# Patient Record
(demographics unavailable — no encounter records)

---

## 2024-10-20 NOTE — HISTORY OF PRESENT ILLNESS
[Cow's milk (Ounces per day ___)] : consumes [unfilled] oz of cow's milk per day [Fruit] : fruit [Vegetables] : vegetables [Meat] : meat [Finger Foods] : finger foods [Table food] : table food [___ voids per day] : [unfilled] voids per day [Normal] : Normal [In crib] : In crib [Sippy cup use] : Sippy cup use [Bottle in bed] : Bottle in bed [Brushing teeth] : Brushing teeth [No] : Not at  exposure [Water heater temperature set at <120 degrees F] : Water heater temperature set at <120 degrees F [Car seat in back seat] : Car seat in back seat [Carbon Monoxide Detectors] : Carbon monoxide detectors [Smoke Detectors] : Smoke detectors [Parents] : parents [Delayed] : Delayed [Exposure to electronic nicotine delivery system] : No exposure to electronic nicotine delivery system

## 2024-10-20 NOTE — DISCUSSION/SUMMARY
[Communication and Social Development] : communication and social development [Sleep Routines and Issues] : sleep routines and issues [Temper Tantrums and Discipline] : temper tantrums and discipline [Healthy Teeth] : healthy teeth [Safety] : safety [No Medications] : ~He/She~ is not on any medications [Mother] : mother [Father] : father [] : The components of the vaccine(s) to be administered today are listed in the plan of care. The disease(s) for which the vaccine(s) are intended to prevent and the risks have been discussed with the caretaker.  The risks are also included in the appropriate vaccination information statements which have been provided to the patient's caregiver.  The caregiver has given consent to vaccinate. [FreeTextEntry1] : Discussed delays of the child with parents and they do state he gets a lot of screen time throughout the day.  Recommend to stop all screen time immediately and start interacting with the child all day with activities and reading. Will reevaluate in 1 month and if delays persist will refer to EI.  Continue whole cow's milk. Continue table foods, 3 meals with 2-3 snacks per day. Incorporate flourinated water daily in a sippy cup. Brush teeth twice a day with soft toothbrush. Recommend visit to dentist. When in car, keep child in rear-facing car seats until age 2, or until  the maximum height and weight for seat is reached. Put baby to sleep in own crib. Lower crib matress. Help baby to maintain consistent daily routines and sleep schedule. Recognize stranger and separation anxiety. Ensure home is safe since baby is increasingly mobile. Be within arm's reach of baby at all times. Use consistent, positive discipline. Read aloud to baby.  Return in 1 mo for 18 mo well child check.

## 2024-10-20 NOTE — DEVELOPMENTAL MILESTONES
[Uses 3 words other than names] : uses 3 words other than names [Yes: _______] : yes, [unfilled] [Speaks in sounds that seem like] : speaks in sounds that seem like an unknown language [Imitates scribbling] : does not imitate scribbling [Drinks from cup with little] : does not drink from cup with little spilling [Points to ask for something] : does not point to ask for something or to get help [Follows directions that do not] : does not follow direction that do not include a gesture [Looks when parent says,] : does not look when parent says, "Where is...?" [Squats to  objects] : does not squat to  objects [Begins to run] : does not begin to run [Makes dorita with crayon] : does not makes dorita with crayon [FreeTextEntry1] : Discussed delays of the child with parents and they do state he gets a lot of screen time throughout the day.  Recommend to stop all screen time immediately and start interacting with the child all day with activities and reading. Will reevaluate in 1 month and if delays persist will refer to EI.

## 2024-10-20 NOTE — PHYSICAL EXAM
[Alert] : alert [No Acute Distress] : no acute distress [Normocephalic] : normocephalic [Anterior [place] Closed] : anterior fontanelle closed [Red Reflex Bilateral] : red reflex bilateral [PERRL] : PERRL [Normally Placed Ears] : normally placed ears [Auricles Well Formed] : auricles well formed [Clear Tympanic membranes with present light reflex and bony landmarks] : clear tympanic membranes with present light reflex and bony landmarks [No Discharge] : no discharge [Nares Patent] : nares patent [Palate Intact] : palate intact [Uvula Midline] : uvula midline [Tooth Eruption] : tooth eruption  [Supple, full passive range of motion] : supple, full passive range of motion [No Palpable Masses] : no palpable masses [Symmetric Chest Rise] : symmetric chest rise [Clear to Auscultation Bilaterally] : clear to auscultation bilaterally [Regular Rate and Rhythm] : regular rate and rhythm [S1, S2 present] : S1, S2 present [No Murmurs] : no murmurs [+2 Femoral Pulses] : +2 femoral pulses [Soft] : soft [NonTender] : non tender [Non Distended] : non distended [Normoactive Bowel Sounds] : normoactive bowel sounds [No Hepatomegaly] : no hepatomegaly [No Splenomegaly] : no splenomegaly [Central Urethral Opening] : central urethral opening [Testicles Descended Bilaterally] : testicles descended bilaterally [Patent] : patent [Normally Placed] : normally placed [No Abnormal Lymph Nodes Palpated] : no abnormal lymph nodes palpated [No Clavicular Crepitus] : no clavicular crepitus [Negative Burdick-Ortalani] : negative Burdick-Ortalani [Symmetric Buttocks Creases] : symmetric buttocks creases [No Spinal Dimple] : no spinal dimple [NoTuft of Hair] : no tuft of hair [Cranial Nerves Grossly Intact] : cranial nerves grossly intact [No Rash or Lesions] : no rash or lesions

## 2024-10-20 NOTE — PHYSICAL EXAM
[Alert] : alert [No Acute Distress] : no acute distress [Normocephalic] : normocephalic [Anterior [place] Closed] : anterior fontanelle closed [Red Reflex Bilateral] : red reflex bilateral [PERRL] : PERRL [Normally Placed Ears] : normally placed ears [Auricles Well Formed] : auricles well formed [Clear Tympanic membranes with present light reflex and bony landmarks] : clear tympanic membranes with present light reflex and bony landmarks [No Discharge] : no discharge [Nares Patent] : nares patent [Palate Intact] : palate intact [Uvula Midline] : uvula midline [Tooth Eruption] : tooth eruption  [Supple, full passive range of motion] : supple, full passive range of motion [No Palpable Masses] : no palpable masses [Symmetric Chest Rise] : symmetric chest rise [Clear to Auscultation Bilaterally] : clear to auscultation bilaterally [Regular Rate and Rhythm] : regular rate and rhythm [S1, S2 present] : S1, S2 present [No Murmurs] : no murmurs [+2 Femoral Pulses] : +2 femoral pulses [Soft] : soft [NonTender] : non tender [Non Distended] : non distended [Normoactive Bowel Sounds] : normoactive bowel sounds [No Hepatomegaly] : no hepatomegaly [No Splenomegaly] : no splenomegaly [Central Urethral Opening] : central urethral opening [Testicles Descended Bilaterally] : testicles descended bilaterally [Patent] : patent [Normally Placed] : normally placed [No Abnormal Lymph Nodes Palpated] : no abnormal lymph nodes palpated [No Clavicular Crepitus] : no clavicular crepitus [Negative Burdick-Ortalani] : negative Burdick-Ortalani [Symmetric Buttocks Creases] : symmetric buttocks creases [No Spinal Dimple] : no spinal dimple [NoTuft of Hair] : no tuft of hair [Cranial Nerves Grossly Intact] : cranial nerves grossly intact [No Rash or Lesions] : no rash or lesions

## 2024-11-16 NOTE — HISTORY OF PRESENT ILLNESS
[de-identified] : ear infection/developmental concerns [FreeTextEntry6] : The patient is a 17-month-old male who has shown significant improvement since the last visit, including increased interaction, pointing, following of commands and rhythm in singing after completely stopping all screen time. Nasal congestion and cough also have stopped after starting the antibiotic and child seems to have improved.

## 2024-11-16 NOTE — DISCUSSION/SUMMARY
[FreeTextEntry1] : Assessment and Plan: - Ear Infection : Parent-reported improvement in patient's condition. Child has completed the course of antibiotic. - Patient Education: Antibiotic course is completed and child has improved.   - Development : Patient shows improvement in developmental activities including increased interaction and following commands. - Therapeutic Interventions: Continue with reading and having stimulating play with the child. Continue avoiding all screen time. - Patient Education: Parents were encouraged to continue their observation and interaction with the patient. - Follow-Up: 1 month for the 18mo well visit  - Vaccines : Parents are considering administering vaccines during this visit and would like to continue vaccinating 1 at a time. - Therapeutic Interventions: Parents would like 1 vaccine at a time. Dtap offered today. - Patient Education: Vaccine education and risks of vaccinating 1 at a time instead of getting the child fully vaccinated discussed.

## 2025-01-17 NOTE — HISTORY OF PRESENT ILLNESS
[Parents] : parents [Cow's milk (Ounces per day ___)] : consumes [unfilled] oz of Cow's milk per day [Fruit] : fruit [Vegetables] : vegetables [Cereal] : cereal [Table food] : table food [___ stools per day] : [unfilled]  stools per day [Loose] : loose consistency [___ voids per day] : [unfilled] voids per day [Normal] : Normal [Pacifier use] : Pacifier use [Sippy cup use] : Sippy cup use [Bottle in bed] : Bottle in bed [Brushing teeth] : Brushing teeth [Toothpaste] : Primary Fluoride Source: Toothpaste [Playtime] : Playtime  [Temper Tantrums] : Temper Tantrums [No] : Not at  exposure [Water heater temperature set at <120 degrees F] : Water heater temperature set at <120 degrees F [Car seat in back seat] : Car seat in back seat [Carbon Monoxide Detectors] : Carbon monoxide detectors [Smoke Detectors] : Smoke detectors [Exposure to electronic nicotine delivery system] : No exposure to electronic nicotine delivery system [de-identified] : allergic to eggs [FreeTextEntry3] : co-sleeping

## 2025-01-17 NOTE — DEVELOPMENTAL MILESTONES
[Engages with others for play] : engages with others for play [Points to pictures in book] : points to pictures in book [Points to object of interest to] : points to object of interest to draw attention to it [Turns and looks at adult if] : turns and looks at adult if something new happens [Sits in small chair] : sits in small chair [Carries toy while walking] : carries toy while walking [Scribbles spontaneously] : scribbles spontaneously [Throws small ball a few feet] : throws a small ball a few feet while standing [Yes] : Completed. [Help dress and undress self] : does not help dress and undress self [Begins to scoop with spoon] : does not begin to scoop with spoon [Uses 6 to 10 words other than] : does not use 6 to 10 words other than names [Identifies at least 2 body parts] : does not indentify at least 2 body parts [Walks up with 2 feet per step] : does not walk up with 2 feet per step with hand held

## 2025-01-17 NOTE — HISTORY OF PRESENT ILLNESS
[Parents] : parents [Cow's milk (Ounces per day ___)] : consumes [unfilled] oz of Cow's milk per day [Fruit] : fruit [Vegetables] : vegetables [Cereal] : cereal [Table food] : table food [___ stools per day] : [unfilled]  stools per day [Loose] : loose consistency [___ voids per day] : [unfilled] voids per day [Normal] : Normal [Pacifier use] : Pacifier use [Sippy cup use] : Sippy cup use [Bottle in bed] : Bottle in bed [Brushing teeth] : Brushing teeth [Toothpaste] : Primary Fluoride Source: Toothpaste [Playtime] : Playtime  [Temper Tantrums] : Temper Tantrums [No] : Not at  exposure [Water heater temperature set at <120 degrees F] : Water heater temperature set at <120 degrees F [Car seat in back seat] : Car seat in back seat [Carbon Monoxide Detectors] : Carbon monoxide detectors [Smoke Detectors] : Smoke detectors [Exposure to electronic nicotine delivery system] : No exposure to electronic nicotine delivery system [de-identified] : allergic to eggs [FreeTextEntry3] : co-sleeping

## 2025-01-17 NOTE — DISCUSSION/SUMMARY
[Family Support] : family support [Child Development and Behavior] : child development and behavior [Language Promotion/Hearing] : language promotion/hearing [[place] Training Readiness] : toliet training readiness [Safety] : safety [No Medications] : ~He/She~ is not on any medications [Mother] : mother [Father] : father [] : The components of the vaccine(s) to be administered today are listed in the plan of care. The disease(s) for which the vaccine(s) are intended to prevent and the risks have been discussed with the caretaker.  The risks are also included in the appropriate vaccination information statements which have been provided to the patient's caregiver.  The caregiver has given consent to vaccinate. [FreeTextEntry1] : Continue whole cow's milk. Continue table foods, 3 meals with 2-3 snacks per day. Incorporate flourinated water daily in a sippy cup. Brush teeth twice a day with soft toothbrush. Recommend visit to dentist. When in car, keep child in rear-facing car seats until age 2, or until  the maximum height and weight for seat is reached. Put todder to sleep in own bed or crib. Help toddler to maintain consistent daily routines and sleep schedule. Toilet training discussed. Recognize anxiety in new settings. Ensure home is safe. Be within arm's reach of toddler at all times. Use consistent, positive discipline. Read aloud to toddler.

## 2025-01-17 NOTE — PHYSICAL EXAM
[Alert] : alert [No Acute Distress] : no acute distress [Normocephalic] : normocephalic [Anterior [place] Closed] : anterior fontanelle closed [Red Reflex Bilateral] : red reflex bilateral [PERRL] : PERRL [Normally Placed Ears] : normally placed ears [Auricles Well Formed] : auricles well formed [Clear Tympanic membranes with present light reflex and bony landmarks] : clear tympanic membranes with present light reflex and bony landmarks [No Discharge] : no discharge [Nares Patent] : nares patent [Palate Intact] : palate intact [Uvula Midline] : uvula midline [Tooth Eruption] : tooth eruption  [Supple, full passive range of motion] : supple, full passive range of motion [No Palpable Masses] : no palpable masses [Symmetric Chest Rise] : symmetric chest rise [Clear to Auscultation Bilaterally] : clear to auscultation bilaterally [Regular Rate and Rhythm] : regular rate and rhythm [S1, S2 present] : S1, S2 present [No Murmurs] : no murmurs [+2 Femoral Pulses] : +2 femoral pulses [Soft] : soft [NonTender] : non tender [Non Distended] : non distended [Normoactive Bowel Sounds] : normoactive bowel sounds [No Hepatomegaly] : no hepatomegaly [No Splenomegaly] : no splenomegaly [Central Urethral Opening] : central urethral opening [Testicles Descended Bilaterally] : testicles descended bilaterally [Patent] : patent [Normally Placed] : normally placed [No Abnormal Lymph Nodes Palpated] : no abnormal lymph nodes palpated [No Clavicular Crepitus] : no clavicular crepitus [Symmetric Buttocks Creases] : symmetric buttocks creases [No Spinal Dimple] : no spinal dimple [NoTuft of Hair] : no tuft of hair [Cranial Nerves Grossly Intact] : cranial nerves grossly intact [No Rash or Lesions] : no rash or lesions

## 2025-01-17 NOTE — PHYSICAL EXAM
[Alert] : alert [No Acute Distress] : no acute distress [Normocephalic] : normocephalic [Anterior [place] Closed] : anterior fontanelle closed [Red Reflex Bilateral] : red reflex bilateral [PERRL] : PERRL [Normally Placed Ears] : normally placed ears [Auricles Well Formed] : auricles well formed [Clear Tympanic membranes with present light reflex and bony landmarks] : clear tympanic membranes with present light reflex and bony landmarks [No Discharge] : no discharge [Nares Patent] : nares patent [Palate Intact] : palate intact [Uvula Midline] : uvula midline [Tooth Eruption] : tooth eruption  [Supple, full passive range of motion] : supple, full passive range of motion [No Palpable Masses] : no palpable masses [Symmetric Chest Rise] : symmetric chest rise [Clear to Auscultation Bilaterally] : clear to auscultation bilaterally [Regular Rate and Rhythm] : regular rate and rhythm [S1, S2 present] : S1, S2 present [No Murmurs] : no murmurs [+2 Femoral Pulses] : +2 femoral pulses [Soft] : soft [NonTender] : non tender [Non Distended] : non distended [Normoactive Bowel Sounds] : normoactive bowel sounds [No Hepatomegaly] : no hepatomegaly [No Splenomegaly] : no splenomegaly [Central Urethral Opening] : central urethral opening [Testicles Descended Bilaterally] : testicles descended bilaterally [Patent] : patent [Normally Placed] : normally placed [No Abnormal Lymph Nodes Palpated] : no abnormal lymph nodes palpated [No Clavicular Crepitus] : no clavicular crepitus [Symmetric Buttocks Creases] : symmetric buttocks creases [No Spinal Dimple] : no spinal dimple [NoTuft of Hair] : no tuft of hair [Cranial Nerves Grossly Intact] : cranial nerves grossly intact [No Rash or Lesions] : no rash or lesions

## 2025-03-08 NOTE — DISCUSSION/SUMMARY
[FreeTextEntry1] : Assessment and Plan:   - **Developmental Transitions:** Parents were reassured that tantrums and selectivity with food are quite common in toddlers. Encouraged to keep a balanced diet and offer a range of healthy foods.     - Monitor Behavior: Observational changes in behavior will be noted      - Dietary adjustments: Substitute meat with other iron-rich foods like lentils      - Catch-up Vaccination: Received his MMR vaccine administered in consultation with the parents      - Speech Development Support: Encourage language development and observe any changes. Keep screen time to a minimum and even avoid altogether.     - Follow-Up: Schedule a follow-up visit in one month to track progress and administer necessary vaccines

## 2025-03-08 NOTE — HISTORY OF PRESENT ILLNESS
[de-identified] : check up [FreeTextEntry6] : Costa is a [id number] male who's parents express their concern over his increasing intensity during tantrums, food preference leaning away from meats, a peculiar habit of momentarily going limp while engaged in activities, potential risk of measles due to delayed vaccinations, and verbal skills wherein he does not mention 'mom' despite being well aware and responsive.

## 2025-03-08 NOTE — HISTORY OF PRESENT ILLNESS
[de-identified] : check up [FreeTextEntry6] : Costa is a [id number] male who's parents express their concern over his increasing intensity during tantrums, food preference leaning away from meats, a peculiar habit of momentarily going limp while engaged in activities, potential risk of measles due to delayed vaccinations, and verbal skills wherein he does not mention 'mom' despite being well aware and responsive.

## 2025-05-23 NOTE — HISTORY OF PRESENT ILLNESS
[GI Symptoms] : GI SYMPTOMS [Vomiting] : vomiting [___ Day(s)] : [unfilled] day(s) [Diarrhea] : diarrhea [Fever] : no fever [Reduced tear production] : reduced tear production [URI symptoms] : no URI symptoms [Decreased Appetite] : no decreased appetite [Constipation] : no constipation [FreeTextEntry5] : urinated today, ate this morning [de-identified] : no meds, eggs, dogs, nkda